# Patient Record
(demographics unavailable — no encounter records)

---

## 2024-11-03 NOTE — CONSULT LETTER
[Dear  ___] : Dear  [unfilled], [Consult Letter:] : I had the pleasure of evaluating your patient, [unfilled]. [Please see my note below.] : Please see my note below. [Consult Closing:] : Thank you very much for allowing me to participate in the care of this patient.  If you have any questions, please do not hesitate to contact me. [Sincerely,] : Sincerely, [FreeTextEntry3] : Myron Myron I. Kleiner, M.D., FACR Chief, Division of Rheumatology Department of Medicine [facility] [DrElias  ___] : Dr. QUINONES

## 2024-11-03 NOTE — ASSESSMENT
[FreeTextEntry1] : Impression: 72-year-old woman for follow up office visit regarding her joint symptoms and rheumatic diseases, including rheumatoid arthritis, osteopenia, osteoarthritis, and interstitial lung disease.  Note: Patient was last seen May 2024  Patient has been feeling fairly well.  She does describe some joint stiffness in both hands secondary to her osteoarthritis.  However, there is no evidence of active rheumatoid arthritis at this time no active synovitis on exam at this time.  From physical exam remained stable without significant change from previous visits.  Recent lab results revealed ESR 28, WBC 12.8 with normal differential, otherwise unrevealing, with extensive discussion.  Patient continues calcium and vitamin D supplements for her osteopenia.  She has not yet performed the bone densitometry which I ordered last visit.  Patient denies rash or side effects with their medications.  Patient is content with their current treatment regimen.  Plan: I reviewed  chart and previous records  I reviewed previous lab results with patient--with extensive discussion Laboratory tests not ordered today--she just performed the lab tests I ordered last visit several weeks ago which I reported above X-rays ordered--- see list below--with coordination of care Noncontrast chest CAT scan ordered--with coordination of care I urged her to perform the bone densitometry which I ordered last visit--extensive discussion Diagnosis and prognosis discussed Continue other current medications (other than those changed below) Patient declined change or addition of any medications at this time Tylenol 1000 mg t.i.d. p.r.n. joint pain or Tylenol 1300 mg b.i.d. p.r.n. joint pain (possible side effects explained) I recommended that she get the annual flu vaccine and COVID-vaccine as recommended--extensive discussion Return visit 6 months All questions and concerns were addressed. Total time for this office visit, including face-to-face time and non-face-to-face time, 71 minutes--- including review of the chart and previous records, detailed review of her medical history, review of previous lab results with extensive discussion with the patient, review of previous imaging reports/x-ray results, ordering of new x-rays with coordination of care, ordering of new noncontrast chest CAT scan with coordination of care, urged her to perform the bone densitometry which I ordered last visit with extensive discussion, recommended annual flu vaccine and COVID-vaccine as recommended, detailed medication history, review of medications going forward with their possible side effects

## 2024-11-03 NOTE — CONSULT LETTER
[Dear  ___] : Dear  [unfilled], [Consult Letter:] : I had the pleasure of evaluating your patient, [unfilled]. [Please see my note below.] : Please see my note below. [Consult Closing:] : Thank you very much for allowing me to participate in the care of this patient.  If you have any questions, please do not hesitate to contact me. [Sincerely,] : Sincerely, [DrElias  ___] : Dr. QUINONES [FreeTextEntry3] : Myron Myron I. Kleiner, M.D., FACR Chief, Division of Rheumatology Department of Medicine [facility]

## 2024-11-03 NOTE — HISTORY OF PRESENT ILLNESS
[FreeTextEntry1] : 72-year-old woman for follow up office visit regarding her joint symptoms and rheumatic diseases, including rheumatoid arthritis, osteopenia, and osteoarthritis, and interstitial lung disease.  Note: Patient was last seen May 2024  Patient feels fairly well.  She has some joint stiffness bilateral hands.  She denies other recent joint pain.  No respiratory symptoms, shortness of breath, cough/sputum production, hemoptysis, ankle swelling, other joint symptoms.  Patient continues calcium and vitamin D supplements.  Patient denies rash or side effects with their medications.  Patient is content with their current medications.  She has not yet performed the bone densitometry which I ordered last visit.

## 2025-05-05 NOTE — CONSULT LETTER
[Dear  ___] : Dear  [unfilled], [Consult Letter:] : I had the pleasure of evaluating your patient, [unfilled]. [Please see my note below.] : Please see my note below. [Consult Closing:] : Thank you very much for allowing me to participate in the care of this patient.  If you have any questions, please do not hesitate to contact me. [Sincerely,] : Sincerely, [DrElias  ___] : Dr. QUINONES [FreeTextEntry3] : Myron Myron I. Kleiner, M.D., FACR Chief, Division of Rheumatology Department of Medicine [facility]

## 2025-05-05 NOTE — HISTORY OF PRESENT ILLNESS
[FreeTextEntry1] : TIKI MENDOZA is a 72 year old woman who presents for follow up office visit for further evaluation of joint symptoms and rheumatic diseases including rheumatoid arthritis, osteopenia, osteoarthritis, and interstitial lung disease.  Note: Patient was last seen October 2024  Patient feels fairly well. Denies recent joint pain. Morning stiffness lasting for 5 minutes. Some recent dry eyes and denies dry mouth. Not using artificial tears, biotene mouthwash. Oral hydration with relief. On her own she tried decreased Flurbiprofen one tab daily which she had increased joint pain. Patient continues calcium and vitamin D supplements.  Patient denies rash or side effects with their medications.  Patient is content with their current medications.

## 2025-05-05 NOTE — ADDENDUM
[FreeTextEntry1] :  I, Ruiz Walters, acted solely as a scribe for Dr. Myron I. Kleiner, MD. on 04/30/2025. I personally performed the services described in the documentation, reviewed the documentation recorded by the scribe in my presence, and it accurately and completely records my words and actions.

## 2025-05-05 NOTE — ASSESSMENT
[FreeTextEntry1] : Impression: TIKI MENDOZA is a 72 year old woman who presents for follow up office visit for further evaluation of joint symptoms and rheumatic diseases including rheumatoid arthritis, osteopenia with high risk of fracture, osteoarthritis, and interstitial lung disease.  Note: Patient was last seen October 2024  Patient feels fairly well. Denies recent joint pain with her osteoarthritis quiescent. There is no evidence of active rheumatoid arthritis at this time no active synovitis on exam at this time. Morning stiffness lasting for 5 minutes. Some recent dry eyes and denies dry mouth - on exam pt has dry eyes and tongue slightly moist - I will continue to monitor for Sjogren Syndrome. Not using artificial tears, biotene mouthwash. Oral hydration with relief. On her own she tried decreased Flurbiprofen one tab daily with which she had increased joint pain. Recent xray results revealed osteoarthritis bilateral radiocarpal joint, bilateral 1st CMC joint, bilateral toes, old healed fracture right 3rd MT bone and left 2nd MT bone -- with extensive discussion. Recent chest CAT scan results revealed mild pulmonary fibrosis, nodule in the RLL -- with extensive discussion. Recent bone densitometry results revealed osteopenia with high risk of fracture including T-score 1/3 radius = -2.0; total radius = -1.7 with 1.5% decrease when compared to previous study; total hip = -1.4 with 3.6% decrease when compared to previous study; FRAX major osteoporotic fracture 19%/ hip fracture 2.5% -- with extensive discussion. Patient continues calcium and vitamin D supplements for her osteopenia with high risk of fracture.   Patient denies rash or side effects with their medications.  Patient is content with their current treatment regimen.  Plan: I reviewed  chart and previous records  X-rays results reviewed with the patient with extensive discussion I reviewed recent chest CAT scan results with patient -- with extensive discussion I reviewed recent Bone densitometry results with patient including my analysis of raw data with extensive discussion Laboratory tests ordered  see list below  with coordination of care Diagnosis and prognosis discussed Continue other current medications (other than those changed below) Risedronate Sodium 150 mg q.1 month on an empty stomach with a large tap glass of water 30 minutes a.c. breakfast and do not lie down for 2 hours (Possible side effects explained including AVN of jaw) -- If patient's insurance does not cover a prescription of Risedronate I will authorize Alendronate 70 mg q.1 week on empty stomach with large glass of water 30 min a.c breakfast (Possible side effects explained including AVN of jaw)  Continue Calcium 500 mg t.i.d. at the end of meals or 600 mg b.i.d end of meals (Possible side effects explained)  Continue Vitamin D 50,000 units once every 2 week (possible side effects explained) Tylenol 1000 mg t.i.d. p.r.n. joint pain or Tylenol 1300 mg b.i.d. p.r.n. joint pain (possible side effects explained) Artificial tears one drop each eye q.i.d. and p.r.n.(Possible side effects explained) BioXtra mouthwash/spray q.i.d. and p.r.n.(Possible side effects explained) Oral Hydration Patient declines oral medication for dryness Bilateral knee exercises--instruction sheet given and discussed - exercise demonstrated with extensive discussion Pt declined PT -- with extensive discussion Pt declined Pulmonary consult regarding mild pulmonary fibrosis -- she understands the need and consequences -- with extensive discussion  Return visit 6 months All questions and concerns were addressed. Total time for this office visit, including face-to-face time and non-face-to-face time, 87 minutes--- including review of the chart and previous records, detailed review of her medical history, review of previous lab results with extensive discussion with the patient, ordering lab tests with coordination of care, review of recent imaging reports/x-ray results with extensive discussion with the patient, review of recent chest CAT scan results with extensive discussion, review of her recent bone densitometry results including my analysis of the raw data with extensive discussion,, detailed medication history, review of medications going forward with their possible side effects, reviewed modalities for treatment of her dryness with extensive discussion, ordered treatment for her osteopenia with risedronate for her high risk of fracture, reviewed the impact of the patient's rheumatic disease on their other medical problems, reviewed the impact of the patient's other medical problems on their rheumatic disease

## 2025-05-05 NOTE — CONSULT LETTER
[Dear  ___] : Dear  [unfilled], [Consult Letter:] : I had the pleasure of evaluating your patient, [unfilled]. [Please see my note below.] : Please see my note below. [Consult Closing:] : Thank you very much for allowing me to participate in the care of this patient.  If you have any questions, please do not hesitate to contact me. [Sincerely,] : Sincerely, [DrElias  ___] : Dr. QUINONES [FreeTextEntry3] : Myron Myron I. Kleiner, M.D., FACR Chief, Division of Rheumatology Department of Medicine [facility]